# Patient Record
Sex: FEMALE | Race: WHITE | NOT HISPANIC OR LATINO
[De-identification: names, ages, dates, MRNs, and addresses within clinical notes are randomized per-mention and may not be internally consistent; named-entity substitution may affect disease eponyms.]

---

## 2022-03-30 ENCOUNTER — APPOINTMENT (OUTPATIENT)
Dept: VASCULAR SURGERY | Facility: CLINIC | Age: 67
End: 2022-03-30

## 2023-04-20 ENCOUNTER — APPOINTMENT (OUTPATIENT)
Dept: ORTHOPEDIC SURGERY | Facility: CLINIC | Age: 68
End: 2023-04-20
Payer: MEDICARE

## 2023-04-20 DIAGNOSIS — M46.1 SACROILIITIS, NOT ELSEWHERE CLASSIFIED: ICD-10-CM

## 2023-04-20 DIAGNOSIS — M51.9 UNSPECIFIED THORACIC, THORACOLUMBAR AND LUMBOSACRAL INTERVERTEBRAL DISC DISORDER: ICD-10-CM

## 2023-04-20 DIAGNOSIS — M54.16 RADICULOPATHY, LUMBAR REGION: ICD-10-CM

## 2023-04-20 DIAGNOSIS — M70.62 TROCHANTERIC BURSITIS, RIGHT HIP: ICD-10-CM

## 2023-04-20 DIAGNOSIS — M47.816 SPONDYLOSIS W/OUT MYELOPATHY OR RADICULOPATHY, LUMBAR REGION: ICD-10-CM

## 2023-04-20 DIAGNOSIS — M70.61 TROCHANTERIC BURSITIS, RIGHT HIP: ICD-10-CM

## 2023-04-20 PROCEDURE — 99213 OFFICE O/P EST LOW 20 MIN: CPT

## 2023-04-21 PROBLEM — M70.61 TROCHANTERIC BURSITIS OF BOTH HIPS: Status: ACTIVE | Noted: 2023-04-21

## 2023-04-21 PROBLEM — M54.16 LUMBAR RADICULOPATHY: Status: ACTIVE | Noted: 2023-04-21

## 2023-04-21 PROBLEM — M46.1 SI (SACROILIAC) JOINT INFLAMMATION: Status: ACTIVE | Noted: 2023-04-21

## 2023-04-21 PROBLEM — M47.816 LUMBAR SPONDYLOSIS: Status: ACTIVE | Noted: 2023-04-21

## 2023-04-21 PROBLEM — M51.9 THORACIC DISC DISEASE: Status: ACTIVE | Noted: 2023-04-21

## 2023-04-21 NOTE — HISTORY OF PRESENT ILLNESS
[7] : 7 [5] : 5 [Radiating] : radiating [] : yes [de-identified] : 4/21/23- 68 y/o F presents for T and L spine pain X several years, worse after bending down 1 month ago. Associated radiation down BLEs posteriorly to ankles. Has tried bracing, with partial relief. Denies b/b dysfunction. Previously seen by Dr. Murguia 2 years ago. Denies prior back surgeries or physical therapy. \par \par PMH: Osteoporosis, HTN, high cholesterol [FreeTextEntry5] : STEPHANIE 67 year old F here for Mid to Lower Back , onset pain for about 30 years \par radiating pain down both legs - posterity to ankles \par pt has tried bracing with partial relief \par no back sx or PT  [FreeTextEntry7] : down both legs  [de-identified] : 2 years ago  [de-identified] : fuast

## 2023-04-21 NOTE — ASSESSMENT
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 Franck Shahid M.D.\par

## 2023-04-21 NOTE — IMAGING
[Facet arthropathy] : Facet arthropathy [Disc space narrowing] : Disc space narrowing [de-identified] : L spine\par \par Inspection: No rash or ecchymosis \par \par Palpation: Midline lumbar tenderness. Bilateral lumbar paraspinal muscle tenderness. Bilateral SI joint tenderness to palpation. \par \par ROM: Full with no pain \par \par Strength: 5/5 bilateral hip flexors, knee extensors, ankle dorsiflexors, EHL, ankle plantarflexors \par \par Sensation: Sensation present to light touch bilateral L2-S1 distributions \par \par Provocative maneuvers: Negative bilateral straight leg raise\par \par Bilateral hips- \par \par Palpation: bilateral GT TTP\par \par \par \par \par  [FreeTextEntry1] : degenerative spondylolisthesis L4-5

## 2024-05-02 ENCOUNTER — EMERGENCY (EMERGENCY)
Facility: HOSPITAL | Age: 69
LOS: 1 days | Discharge: HOME | End: 2024-05-02
Attending: EMERGENCY MEDICINE
Payer: MEDICARE

## 2024-05-02 VITALS
TEMPERATURE: 99 F | HEART RATE: 97 BPM | SYSTOLIC BLOOD PRESSURE: 119 MMHG | RESPIRATION RATE: 18 BRPM | DIASTOLIC BLOOD PRESSURE: 88 MMHG | OXYGEN SATURATION: 99 %

## 2024-05-02 VITALS
HEIGHT: 66 IN | SYSTOLIC BLOOD PRESSURE: 134 MMHG | OXYGEN SATURATION: 97 % | RESPIRATION RATE: 20 BRPM | DIASTOLIC BLOOD PRESSURE: 85 MMHG | HEART RATE: 94 BPM | TEMPERATURE: 98 F | WEIGHT: 190.04 LBS

## 2024-05-02 PROCEDURE — 73610 X-RAY EXAM OF ANKLE: CPT | Mod: 26,RT

## 2024-05-02 PROCEDURE — 29515 APPLICATION SHORT LEG SPLINT: CPT | Mod: RT

## 2024-05-02 PROCEDURE — 73590 X-RAY EXAM OF LOWER LEG: CPT

## 2024-05-02 PROCEDURE — 73590 X-RAY EXAM OF LOWER LEG: CPT | Mod: 26,RT

## 2024-05-02 PROCEDURE — 93971 EXTREMITY STUDY: CPT | Mod: 26,RT

## 2024-05-02 PROCEDURE — 99284 EMERGENCY DEPT VISIT MOD MDM: CPT | Mod: 25

## 2024-05-02 PROCEDURE — 93971 EXTREMITY STUDY: CPT

## 2024-05-02 PROCEDURE — 99285 EMERGENCY DEPT VISIT HI MDM: CPT | Mod: 25

## 2024-05-02 PROCEDURE — 73630 X-RAY EXAM OF FOOT: CPT | Mod: 26,RT

## 2024-05-02 PROCEDURE — 73610 X-RAY EXAM OF ANKLE: CPT

## 2024-05-02 PROCEDURE — 73630 X-RAY EXAM OF FOOT: CPT

## 2024-05-02 NOTE — ED PROVIDER NOTE - OBJECTIVE STATEMENT
68y F pmhx anxiety/depression, GERD, presents to ED c/o R ankle and foot swelling and bruising s/p eversion injury while doing down stains 4 days ago. Slipped on 2nd to last step going down, rolled ankle and fell on buttocks. No head strike or LOC. No AC use. Initially weight bearing but has unable to bear weight since Sunday. No numbness. No f/c. Also endorsing calf pain. No hx of blood clots. No cp, sob, abd pain, nvd, back pain.

## 2024-05-02 NOTE — ED PROVIDER NOTE - PHYSICAL EXAMINATION
GEN: Pt non-toxic in NAD, alert.  PSYCH: Affect and mood appropriate.  EYES: Sclera white w/o injection, EOMI.   ENT: Neck supple. Airway patent.  RESP: CTA b/l.  CARDIAC: RRR, S1, S2, no M/G/R.  ABD: Soft, NT. No CVAT b/l.  MSK: R ankle and midfoot edema and ecchymosis. TTP to midfoot. No malleolar ttp. No ttp along 5th metatarsal. Strong DP pulse. Sensation intact. Good cap refill. FROM R knee. No midline spinal ttp. Nontender sacral contusion.  NEURO: Nonfocal.  VASC: Strong distal pulses. No pitting edema.  SKIN: No rashes or lesions.

## 2024-05-02 NOTE — ED PROVIDER NOTE - NSFOLLOWUPINSTRUCTIONS_ED_ALL_ED_FT
Follow-up with orthopedics or podiatry within 7 days.    [facility]  Podiatry  [address] [place] [address] - [address] [place] [address]  Phone: [phone]    Keep splint clean, dry, and intact until you are seen by outpatient provider.    Rest the affected area as much as possible. Elevate the area, use ice to reduce swelling and pain.    Pain can be managed with Tylenol and Ibuprofen over the counter as directed.    Continue to take all other medications as directed.    Return to the emergency room immediately if your symptoms worsen or persist, or if you have a high or concerning fever, new or worsening pain in the affected area, numbness or tingling of the affected area, inability to move the affected area, increased swelling, redness of affected area, or if any other concerning or questionable symptoms.

## 2024-05-02 NOTE — ED ADULT NURSE NOTE - OBJECTIVE STATEMENT
68 yr old female fell 1 week ago down two steps still complaining of a lot of pain and calf pain. on assessment a and o x 3 lungs clear abd soft non tender no swelling in left ankle some in right. unable to bare weight. no other complaints or issues.

## 2024-05-02 NOTE — ED PROVIDER NOTE - ATTENDING APP SHARED VISIT CONTRIBUTION OF CARE
spontaneous Attending MD Hartmann:   I personally have seen and examined this patient.  Physician assistant note reviewed and agree on plan of care and except where noted.  See below for details.     Seen in [facility], accompanied by     68F with anxiety, depression, GERD presents to the ED with R foot pain and swelling s/p fall.  Reports on Sunday slipped and missed two steps landing on her back.  Reports twisted her ankle and since then has had 4 days of pain.  Reports initially was able to bear weight but has not since Sunday.  Reports pain to behind R knee but reports FROM at R knee.  Denies history of DVT.  Denies chest pain, shortness of breath, abdominal pain, nausea, vomiting, diarrhea, urinary complaints. Denies loss of urinary or bowel continence. Denies numbness, weakness or tingling in extremities. Denies fevers.  Denies preceding dizziness, weakness, sensory changes.  Denies LOC, hitting head.  Reports landed on buttocks.      Exam:   General: NAD  HENT: head NCAT, airway patent  Eyes: anicteric, no conjunctival injection   Lungs: lungs CTAB with good inspiratory effort, no wheezing, no rhonchi, no rales  Cardiac: +S1S2, no obvious m/r/g  GI: abdomen soft with +BS, NT, ND  : no CVAT  MSK: ranging neck and extremities freely, no midline spine tenderness, including palpation of area over top of eliecer cleft with ecchymosis, R ankle and foot with edema and ecchymosis along lateral aspect, +tenderness to palpation to midfoot, no tenderness to palpation at malleoli, +DP  Neuro: moving all extremities spontaneously, nonfocal  Psych: normal mood and affect     A/P: 68F with R foot and ankle ecchymosis and edema    TO BE COMPLETED Attending MD Hartmann:   I personally have seen and examined this patient.  Physician assistant note reviewed and agree on plan of care and except where noted.  See below for details.     Seen in [facility], accompanied by     68F with anxiety, depression, GERD presents to the ED with R foot pain and swelling s/p fall.  Reports on Sunday slipped and missed two steps landing on her back.  Reports twisted her ankle and since then has had 4 days of pain.  Reports initially was able to bear weight but has not since Sunday.  Reports pain to behind R knee but reports FROM at R knee.  Denies history of DVT.  Denies chest pain, shortness of breath, abdominal pain, nausea, vomiting, diarrhea, urinary complaints. Denies loss of urinary or bowel continence. Denies numbness, weakness or tingling in extremities. Denies fevers.  Denies preceding dizziness, weakness, sensory changes.  Denies LOC, hitting head.  Reports landed on buttocks.      Exam:   General: NAD  HENT: head NCAT, airway patent  Eyes: anicteric, no conjunctival injection   Lungs: lungs CTAB with good inspiratory effort, no wheezing, no rhonchi, no rales  Cardiac: +S1S2, no obvious m/r/g  GI: abdomen soft with +BS, NT, ND  : no CVAT  MSK: ranging neck and extremities freely, no midline spine tenderness, including palpation of area over top of eliecer cleft with ecchymosis, R ankle and foot with edema and ecchymosis along lateral aspect, +tenderness to palpation to midfoot, no tenderness to palpation at malleoli, +DP  Neuro: moving all extremities spontaneously, nonfocal  Psych: normal mood and affect     A/P: 68F with R foot and ankle ecchymosis and edema, will obtain XRs to eval for bony injury, will obtain US to eval for DVT

## 2024-05-02 NOTE — ED PROVIDER NOTE - NSFOLLOWUPCLINICS_GEN_ALL_ED_FT
[facility]  Podiatry  [address] [place] [address] - [address] [place] [address]  Phone: [phone]  Fax:

## 2024-05-02 NOTE — ED PROVIDER NOTE - PATIENT PORTAL LINK FT
You can access the FollowMyHealth Patient Portal offered by [facility] by registering at the following website: [email or web address]. By joining [facility]’s FollowMyHealth portal, you will also be able to view your health information using other applications (apps) compatible with our system.

## 2024-05-02 NOTE — ED ADULT TRIAGE NOTE - CHIEF COMPLAINT QUOTE
right ankle pain x 1 week after falling down 2 steps last week  Associated with calf pain while sleeping

## 2024-05-02 NOTE — ED PROVIDER NOTE - PROGRESS NOTE DETAILS
Jaskaran Farley PA-C: No acute fracture or misalignment on xr. Posterior splint placed in ED. pt visualized use of crutches. Pt has ortho she can f/u w/ but will also give podiatry f/u info.

## 2024-05-06 ENCOUNTER — APPOINTMENT (OUTPATIENT)
Dept: ORTHOPEDIC SURGERY | Facility: CLINIC | Age: 69
End: 2024-05-06
Payer: MEDICARE

## 2024-05-06 DIAGNOSIS — S93.401A SPRAIN OF UNSPECIFIED LIGAMENT OF RIGHT ANKLE, INITIAL ENCOUNTER: ICD-10-CM

## 2024-05-06 PROCEDURE — 99213 OFFICE O/P EST LOW 20 MIN: CPT

## 2024-05-06 NOTE — ASSESSMENT
[FreeTextEntry1] : Right ankle injury 10 days ago.  Went to urgent care and was placed into soft splint and postop shoe.  Notes some improvement of symptoms.  On exam, pain over the ATFL.  X-rays reviewed from outside facility without signs of acute injury. - Advised to transition to lace up ankle brace which she can wear in a regular shoe or her postop shoe to comfort - Physical therapy referral - Ace wrap applied for comfort and stability given that she needs to purchase a lace up ankle brace on her own - Recommend use of Tylenol and ice as needed - Elevation to help with swelling - Follow-up in 2 to 3 weeks

## 2024-05-06 NOTE — IMAGING
[de-identified] : L ankle/foot: - No obvious ankle or foot deformity - No pain with palpation of achilles, medial or lateral malleoli, base of 5th metatarsal, navicular, metatarsals, or digits - ROM intact throughout ankle dorsiflexion, plantarflexion, inversion, eversion. Toes with normal flexion and extension. - 5/5 strength throughout - Distally neurovascularly intact   R ankle/foot: - No obvious ankle or foot deformity -Tender over ATFL with lateral ankle swelling - No pain with palpation of achilles, medial  malleoli, base of 5th metatarsal, navicular, metatarsals, or digits - ROM intact throughout ankle dorsiflexion, plantarflexion, inversion, eversion. Toes with normal flexion and extension. - 5/5 strength throughout - Pain with anterior drawer - Negative calcaneal and metatarsal squeeze - Distally neurovascularly intact    [Right] : right foot [Outside films reviewed] : Outside films reviewed [There are no fractures, subluxations or dislocations. No significant abnormalities are seen] : There are no fractures, subluxations or dislocations. No significant abnormalities are seen

## 2024-05-06 NOTE — HISTORY OF PRESENT ILLNESS
[de-identified] : 05/06/2024: STEPHANIE MORAN is a 68 year old female here today for evaluation of right foot/ankle. Patient notes she rolled her ankle about 10 days ago on the stairs. Patient went to ER on Thursday, recieved XR and was placed in soft cast and PO shoe. Patient reports swelling present, bruising present, denies n/t.   Injury involved? Yes rolled ankle When? 10 days ago Worse/better/same since pain started? Better Pain at rest? No Pain with what activities? Any weight bearing activities Does the pain radiate? No Any OTC treatments that have improved pain? Ice for swelling, Tylenol Any history of previous injury, treatment, or surgery in the same location? No Previous imaging completed and where? XR at [facility] available Any weakness involved? Yes Any numbness/tingling involved? No Any swelling involved? Yes If adult, ask if smoker, on blood thinner, or diabetic: No

## 2024-05-06 NOTE — PHYSICAL EXAM
[de-identified] : Constitutional: Well developed, well nourished, able to communicate Neuro: Normal sensation, No focal deficits Skin: Intact CV: Peripheral vascular exam grossly normal Pulm: No signs of respiratory distress Psych: Oriented, normal mood and affect

## 2024-05-20 ENCOUNTER — APPOINTMENT (OUTPATIENT)
Dept: ORTHOPEDIC SURGERY | Facility: CLINIC | Age: 69
End: 2024-05-20

## 2024-05-24 ENCOUNTER — APPOINTMENT (OUTPATIENT)
Dept: ORTHOPEDIC SURGERY | Facility: CLINIC | Age: 69
End: 2024-05-24